# Patient Record
Sex: FEMALE | Race: WHITE
[De-identification: names, ages, dates, MRNs, and addresses within clinical notes are randomized per-mention and may not be internally consistent; named-entity substitution may affect disease eponyms.]

---

## 2017-05-26 ENCOUNTER — HOSPITAL ENCOUNTER (OUTPATIENT)
Dept: HOSPITAL 47 - RADNMMAIN | Age: 57
Discharge: HOME | End: 2017-05-26
Payer: COMMERCIAL

## 2017-05-26 DIAGNOSIS — R07.9: Primary | ICD-10-CM

## 2017-05-26 PROCEDURE — 93017 CV STRESS TEST TRACING ONLY: CPT

## 2017-05-26 PROCEDURE — 78452 HT MUSCLE IMAGE SPECT MULT: CPT

## 2017-05-26 NOTE — EST
DATE OF SERVICE:  05/26/2017



AGE:   56Y        SEX:  F        HT:    5'3"       WT:  198 lbs.       

     

Protocol [place]:  Other: Lexiscan Cardiolite  Stage:  Dur. of Exercise: 



*Heart Rate         Blood Pressure                               

*Rest:  66          Rest:  138/83                                

*                              

*Max. Achieved:     91   Maximum BP:  151/70 

85% PMHR:  

100% PMHR:          



*METS:    





INDICATIONS:  Chest pain. 



MEDICATIONS:   Ibuprofen, Xanax. 



Patient was given Lexiscan injection over a period of 15 seconds. Peak 

heart rate of 91 was achieved. Maximum blood pressure of 151/70 mmHg 

was noted. Resting EKG shows normal sinus rhythm with normal NV 

interval and QRS duration and normal ST-T waves. No ST segment 

depression suggestive of ischemia is noted. The results of the nuclear 

study will follow.

## 2017-05-26 NOTE — NM
EXAMINATION TYPE: NM stress lexiscan cardiolite

 

DATE OF EXAM: 5/26/2017 11:24 AM

 

COMPARISON: NONE

 

HISTORY: Chest pain

 

TECHNIQUE:  After the intravenous administration of 10.1 mCi Tc 99m Sestamibi - Cardiolite resting SP
ECT images acquired 45 minutes post injection. 

 

The patient received 0.4mg Lexiscan, 26.4 mCi Tc 99m Sestamibi - Stress images obtained 30 minutes po
st injection 

 

FINDINGS:  

 

Review of stress and rest SPECT images demonstrates there is a fixed defect involving the apex and an
terior wall the myocardium.  Gated analysis shows normal wall motion with an estimated left ventricul
ar ejection fraction of 62 %.

 

 

 

IMPRESSION:

1. Fixed defect involving the myocardium suggestive of previous infarction.

2. No definite stress-induced reversibility.

## 2018-07-31 ENCOUNTER — HOSPITAL ENCOUNTER (INPATIENT)
Dept: HOSPITAL 47 - EC | Age: 58
LOS: 5 days | Discharge: HOME | DRG: 392 | End: 2018-08-05
Attending: SURGERY | Admitting: SURGERY
Payer: COMMERCIAL

## 2018-07-31 VITALS — BODY MASS INDEX: 37.3 KG/M2

## 2018-07-31 DIAGNOSIS — K59.00: ICD-10-CM

## 2018-07-31 DIAGNOSIS — Z90.710: ICD-10-CM

## 2018-07-31 DIAGNOSIS — Z82.49: ICD-10-CM

## 2018-07-31 DIAGNOSIS — E78.5: ICD-10-CM

## 2018-07-31 DIAGNOSIS — K57.80: Primary | ICD-10-CM

## 2018-07-31 DIAGNOSIS — I10: ICD-10-CM

## 2018-07-31 DIAGNOSIS — Z88.5: ICD-10-CM

## 2018-07-31 DIAGNOSIS — Z79.899: ICD-10-CM

## 2018-07-31 DIAGNOSIS — Z96.653: ICD-10-CM

## 2018-07-31 DIAGNOSIS — Z85.41: ICD-10-CM

## 2018-07-31 DIAGNOSIS — F32.9: ICD-10-CM

## 2018-07-31 DIAGNOSIS — M19.90: ICD-10-CM

## 2018-07-31 DIAGNOSIS — Z87.891: ICD-10-CM

## 2018-07-31 LAB
ALBUMIN SERPL-MCNC: 3.6 G/DL (ref 3.5–5)
ALP SERPL-CCNC: 81 U/L (ref 38–126)
ALT SERPL-CCNC: 37 U/L (ref 9–52)
AMYLASE SERPL-CCNC: 54 U/L (ref 30–110)
ANION GAP SERPL CALC-SCNC: 11 MMOL/L
AST SERPL-CCNC: 23 U/L (ref 14–36)
BASOPHILS # BLD AUTO: 0 K/UL (ref 0–0.2)
BASOPHILS NFR BLD AUTO: 0 %
BUN SERPL-SCNC: 12 MG/DL (ref 7–17)
CALCIUM SPEC-MCNC: 9.3 MG/DL (ref 8.4–10.2)
CHLORIDE SERPL-SCNC: 95 MMOL/L (ref 98–107)
CO2 SERPL-SCNC: 32 MMOL/L (ref 22–30)
EOSINOPHIL # BLD AUTO: 0.1 K/UL (ref 0–0.7)
EOSINOPHIL NFR BLD AUTO: 1 %
ERYTHROCYTE [DISTWIDTH] IN BLOOD BY AUTOMATED COUNT: 4.69 M/UL (ref 3.8–5.4)
ERYTHROCYTE [DISTWIDTH] IN BLOOD: 13.5 % (ref 11.5–15.5)
GLUCOSE SERPL-MCNC: 99 MG/DL (ref 74–99)
HCT VFR BLD AUTO: 39.6 % (ref 34–46)
HGB BLD-MCNC: 13.3 GM/DL (ref 11.4–16)
HYALINE CASTS UR QL AUTO: 1 /LPF (ref 0–2)
LIPASE SERPL-CCNC: 81 U/L (ref 23–300)
LYMPHOCYTES # SPEC AUTO: 1.5 K/UL (ref 1–4.8)
LYMPHOCYTES NFR SPEC AUTO: 14 %
MCH RBC QN AUTO: 28.5 PG (ref 25–35)
MCHC RBC AUTO-ENTMCNC: 33.7 G/DL (ref 31–37)
MCV RBC AUTO: 84.5 FL (ref 80–100)
MONOCYTES # BLD AUTO: 0.9 K/UL (ref 0–1)
MONOCYTES NFR BLD AUTO: 8 %
NEUTROPHILS # BLD AUTO: 8.6 K/UL (ref 1.3–7.7)
NEUTROPHILS NFR BLD AUTO: 76 %
PH UR: 5.5 [PH] (ref 5–8)
PLATELET # BLD AUTO: 333 K/UL (ref 150–450)
POTASSIUM SERPL-SCNC: 3.8 MMOL/L (ref 3.5–5.1)
PROT SERPL-MCNC: 6.4 G/DL (ref 6.3–8.2)
PROT UR QL: (no result)
RBC UR QL: 1 /HPF (ref 0–5)
SODIUM SERPL-SCNC: 138 MMOL/L (ref 137–145)
SP GR UR: 1.02 (ref 1–1.03)
SQUAMOUS UR QL AUTO: 9 /HPF (ref 0–4)
UROBILINOGEN UR QL STRIP: <2 MG/DL (ref ?–2)
WBC # BLD AUTO: 11.3 K/UL (ref 3.8–10.6)
WBC #/AREA URNS HPF: 15 /HPF (ref 0–5)

## 2018-07-31 PROCEDURE — 74177 CT ABD & PELVIS W/CONTRAST: CPT

## 2018-07-31 PROCEDURE — 36415 COLL VENOUS BLD VENIPUNCTURE: CPT

## 2018-07-31 PROCEDURE — 83605 ASSAY OF LACTIC ACID: CPT

## 2018-07-31 PROCEDURE — 99285 EMERGENCY DEPT VISIT HI MDM: CPT

## 2018-07-31 PROCEDURE — 96375 TX/PRO/DX INJ NEW DRUG ADDON: CPT

## 2018-07-31 PROCEDURE — 85025 COMPLETE CBC W/AUTO DIFF WBC: CPT

## 2018-07-31 PROCEDURE — 87040 BLOOD CULTURE FOR BACTERIA: CPT

## 2018-07-31 PROCEDURE — 96365 THER/PROPH/DIAG IV INF INIT: CPT

## 2018-07-31 PROCEDURE — 96361 HYDRATE IV INFUSION ADD-ON: CPT

## 2018-07-31 PROCEDURE — 80053 COMPREHEN METABOLIC PANEL: CPT

## 2018-07-31 PROCEDURE — 81001 URINALYSIS AUTO W/SCOPE: CPT

## 2018-07-31 PROCEDURE — 87086 URINE CULTURE/COLONY COUNT: CPT

## 2018-07-31 PROCEDURE — 83690 ASSAY OF LIPASE: CPT

## 2018-07-31 PROCEDURE — 96376 TX/PRO/DX INJ SAME DRUG ADON: CPT

## 2018-07-31 PROCEDURE — 82150 ASSAY OF AMYLASE: CPT

## 2018-07-31 RX ADMIN — CEFAZOLIN SCH MLS/HR: 330 INJECTION, POWDER, FOR SOLUTION INTRAMUSCULAR; INTRAVENOUS at 23:55

## 2018-07-31 NOTE — CT
EXAMINATION TYPE: CT abdomen pelvis w con

 

DATE OF EXAM: 7/31/2018

 

HISTORY: Abdominal pain with nausea

 

CT DLP: 1106.4mGycm  Automated Exposure Control for Dose Reduction was Utilized.

 

CONTRAST: 

CT scan of the abdomen and pelvis is performed with IV Contrast, patient injected with 100 mL of Isov
ue 300.

 

COMPARISON: None.

 

FINDINGS:

 

LUNG BASES: No significant abnormality is appreciated.

 

LIVER/GB: There is a fluid attenuated left hepatic lobe cyst measuring 1.4 cm. Remainder the liver is
 grossly unremarkable. No radiopaque gallstones.

 

PANCREAS: No significant abnormality is seen. No ductal dilatation.

 

SPLEEN: Small splenule seen adjacent to the native spleen. No splenomegaly.

 

ADRENALS: No significant abnormality is seen.

 

KIDNEYS: No significant abnormality is seen.

 

BOWEL: There is an approximately 3.3 x 4.0 x 3.3 cm pericolonic abscess with air-fluid level and nume
myles foci of internal air along the mesenteric border of the sigmoid colon with associated surroundin
g inflammatory fat stranding and phlegmonous changes. Other encapsulated foci of free air are seen mo
re anteriorly on series 3 image 67. This is indicative of microperforation of acute diverticulitis. T
here is associated bowel wall thickening and hyperemia as well as fascial plane thickening. No second
rafaela colonic ileus or obstruction is seen.

 

UTERUS/ADNEXA: Uterus appears surgically absent.

 

LYMPH NODES: No greater than 1cm abdominal or pelvic lymph nodes are appreciated.

 

OSSEOUS STRUCTURES: Osseous structures appear intact..

 

OTHER: Moderate calcific and noncalcific atheromatous plaquing is seen of the abdominal aorta and its
 branches. Abdominal aorta is of normal course and caliber.

 

IMPRESSION: Acute complicated sigmoid diverticulitis with pericolonic abscess and additional foci of 
loculated microperforation/free air. The approximately 3.3 x 4.0 x 3.3 cm pericolonic abscess is seen
 along the mesenteric border of the sigmoid colon and is intimately associated with the bowel wall wi
th a portion that is possibly submucosal, therefore percutaneous drainage may not be accessible.

## 2018-07-31 NOTE — ED
Abdominal Pain HPI





<Reese Muñoz - Last Filed: 18 21:49>





- General


Source: patient


Mode of arrival: ambulatory


Limitations: no limitations





<Cristy Barclay - Last Filed: 18 22:00>





- General


Chief Complaint: Abdominal Pain


Stated Complaint: abd pain


Time Seen by Provider: 18 19:00





- History of Present Illness


Initial Comments: 


57-year-old female patient presents the emergency department today for 

evaluation of lower abdominal pain and fever.  Patient states that she's been 

having pain in her abdomen for the last 4 days. Patient states the pain is 

located across her lower abdomen and is sharp in nature. States it radiates 

into her back. Patient states that yesterday she started to become very 

nauseous.  She states that she has been constipated and passing only very small 

amounts of diarrhea type stool.  States that her abdomen is very tender.  

States that yesterday she developed a fever, has been as high as 101.1F at 

home.  She has no appetite and has not been eating.  States that she does have 

a history of diverticulitis and her symptoms seem similar to when she's had 

that in the past.  States the only abdominal surgery she has had was a tumor 

removed several years ago.  She denies any hematochezia, melena, or 

hematemesis.  States that she was having painful urination a couple of days ago 

but that seems to have resolved.  She denies any hematuria, urinary frequency, 

urinary urgency.  Patient denies any recent rash, shortness breath, chest pain, 

numbness, tingling, dizziness, weakness, hematuria, dysuria, urinary urgency, 

urinary frequency, headache, visual changes, or any other complaints. (Cristy Barclay)





- Related Data


 Home Medications











 Medication  Instructions  Recorded  Confirmed


 


ALPRAZolam 1 mg PO HS 06/15/16 07/31/18


 


Atorvastatin [Lipitor] 40 mg PO DAILY 17


 


Metoprolol Succinate (ER) [Toprol 25 mg PO DAILY 17





Xl]   


 


Triamterene-Hctz 37.5-25Mg 1 cap PO DAILY 17





[Dyazide 37.5-25 Capsule]   


 


Ciprofloxacin HCl [Cipro] 500 mg PO Q12HR 18


 


Phentermine HCl [Adipex-P] 37.5 mg PO QAM 18


 


Potassium 99 mg PO DAILY 18


 


Vitamin B Complex 1 cap PO DAILY 18











 Allergies











Allergy/AdvReac Type Severity Reaction Status Date / Time


 


hydromorphone HCl AdvReac Severe Nausea & Verified 18 18:38





[From Dilaudid]   Vomiting  














Review of Systems


ROS Other: All systems not noted in ROS Statement are negative.





<Reese Muñoz - Last Filed: 18 21:49>


ROS Other: All systems not noted in ROS Statement are negative.





<Cristy Barclay - Last Filed: 18 22:00>


ROS Statement: 


Those systems with pertinent positive or pertinent negative responses have been 

documented in the HPI.








Past Medical History


Past Medical History: Cancer, Osteoarthritis (OA)


Additional Past Medical History / Comment(s): Arthritis, Bilateral total knees 

[id number], Cervical cancer , total hysterectomy, hiatal hernia, rectal 

fistula status post surgery with recurrence.


History of Any Multi-Drug Resistant Organisms: None Reported


Past Surgical History: Hysterectomy, Joint Replacement


Additional Past Surgical History / Comment(s): Tie wrap for rectal fistula last 

colonoscopy less than one year ago, removal of ovarian tumor which was benign, 

bilateral bunionectomies, bilateral total knee replacements. 17 total 

breast reduction, left and liposuction.


Past Anesthesia/Blood Transfusion Reactions: No Reported Reaction


Smoking Status: Former smoker


Past Alcohol Use History: None Reported


Past Drug Use History: Marijuana





- Past Family History


  ** Mother


Family Medical History: Cancer, CVA/TIA


Additional Family Medical History / Comment(s): Mother is alive in her 80s with 

history of CVA and resides in a nursing home.





  ** Father


Family Medical History: No Reported History


Additional Family Medical History / Comment(s): Father is alive in his 80s with 

history of hypertension and benign brain tumor.





  ** Brother(s)


Additional Family Medical History / Comment(s): She has 3 brothers and one has 

 from a drug overdose.  She does not have any sisters.  She has one 

daughter that was an adopted niece.





<Cristy Barclay - Last Filed: 18 22:00>





General Exam


Limitations: no limitations


General appearance: alert, in no apparent distress, other (Social well-developed

, well-nourished adult female patient in mild distress related to pain.  Vital 

signs upon presentation are temperature 99.5F, pulse 95, respirations 16, 

blood pressure 126/82, pulse ox 97% on room air.)


Eye exam: Present: normal appearance, PERRL, EOMI.  Absent: scleral icterus, 

conjunctival injection, periorbital swelling


ENT exam: Present: normal exam, normal oropharynx, mucous membranes moist


Respiratory exam: Present: normal lung sounds bilaterally.  Absent: respiratory 

distress, wheezes, rales, rhonchi, stridor


Cardiovascular Exam: Present: regular rate, normal rhythm, normal heart sounds.

  Absent: systolic murmur, diastolic murmur, rubs, gallop, clicks


GI/Abdominal exam: Present: soft, tenderness (Lower abdominal tenderness worse 

over the left lower quadrant), normal bowel sounds.  Absent: distended, guarding

, rebound, rigid


Neurological exam: Present: alert, oriented X3, CN II-XII intact


Psychiatric exam: Present: normal affect, normal mood


Skin exam: Present: warm, dry, intact, normal color.  Absent: rash





<Cristy Barclay - Last Filed: 18 22:00>





Course





<Reese Muñoz - Last Filed: 18 21:49>





<Cristy Barclay - Last Filed: 18 22:00>


 Vital Signs











  18





  18:27 21:07


 


Temperature 99.5 F 99.4 F


 


Pulse Rate 95 71


 


Respiratory 16 18





Rate  


 


Blood Pressure 126/82 102/51


 


O2 Sat by Pulse 97 96





Oximetry  














- Reevaluation(s)


Reevaluation #1: 





18 21:42


Patient meets sepsis criteria at this time. Antibiotics ordered.  (Cristy Barclay)





Medical Decision Making





- Lab Data


Result diagrams: 


 18 19:50





 18 19:50





<Reese Muñoz - Last Filed: 18 21:49>





- Lab Data


Result diagrams: 


 18 19:50





 18 19:50





- Radiology Data


Radiology results: report reviewed, image reviewed





<Cristy Barclay - Last Filed: 18 22:00>





- Medical Decision Making





Patient reevaluated by myself, Dr. Muñoz.  Abdomen soft with mild tenderness 

left lower abdomen.  Patient and family are updated on results and plan.  Case 

was discussed in detail with Dr. Mcwilliams, who will admit for surgical call with 

IV antibiotics and will reevaluate in the morning.  I reviewed and agree with.  

Findings included all diagnostic interpretation and treatment plan. (Reese Muñoz)


57-year-old female patient presented to emergency department today for 

evaluation of lower abdominal pain and fever.  Physical examination did reveal 

lower abdominal tenderness worse in the left lower quadrant.  Labs reviewed and 

did reveal a mildly elevated white blood cell count.  Lactic acid was negative.

  Urine has been sent for culture.  CT of the abdomen and pelvis was obtained 

and did show complicated diverticulitis with pericolonic abscess and 

microperforation.  My attending Dr. Muñoz did speak to the surgeon on call Dr. Harrison.  Patient will be admitted for surgical evaluation.  We did start 

Levaquin and Flagyl.  Did inform patient and family of results and plan, they 

are agreeable.


 (Cristy Barclay)





- Lab Data


 Lab Results











  18 Range/Units





  19:50 19:50 19:50 


 


WBC   11.3 H   (3.8-10.6)  k/uL


 


RBC   4.69   (3.80-5.40)  m/uL


 


Hgb   13.3   (11.4-16.0)  gm/dL


 


Hct   39.6   (34.0-46.0)  %


 


MCV   84.5   (80.0-100.0)  fL


 


MCH   28.5   (25.0-35.0)  pg


 


MCHC   33.7   (31.0-37.0)  g/dL


 


RDW   13.5   (11.5-15.5)  %


 


Plt Count   333   (150-450)  k/uL


 


Neutrophils %   76   %


 


Lymphocytes %   14   %


 


Monocytes %   8   %


 


Eosinophils %   1   %


 


Basophils %   0   %


 


Neutrophils #   8.6 H   (1.3-7.7)  k/uL


 


Lymphocytes #   1.5   (1.0-4.8)  k/uL


 


Monocytes #   0.9   (0-1.0)  k/uL


 


Eosinophils #   0.1   (0-0.7)  k/uL


 


Basophils #   0.0   (0-0.2)  k/uL


 


Sodium  138    (137-145)  mmol/L


 


Potassium  3.8    (3.5-5.1)  mmol/L


 


Chloride  95 L    ()  mmol/L


 


Carbon Dioxide  32 H    (22-30)  mmol/L


 


Anion Gap  11    mmol/L


 


BUN  12    (7-17)  mg/dL


 


Creatinine  0.80    (0.52-1.04)  mg/dL


 


Est GFR (CKD-EPI)AfAm  >90    (>60 ml/min/1.73 sqM)  


 


Est GFR (CKD-EPI)NonAf  84    (>60 ml/min/1.73 sqM)  


 


Glucose  99    (74-99)  mg/dL


 


Plasma Lactic Acid [place]    0.9  (0.7-2.0)  mmol/L


 


Calcium  9.3    (8.4-10.2)  mg/dL


 


Total Bilirubin  0.5    (0.2-1.3)  mg/dL


 


AST  23    (14-36)  U/L


 


ALT  37    (9-52)  U/L


 


Alkaline Phosphatase  81    ()  U/L


 


Total Protein  6.4    (6.3-8.2)  g/dL


 


Albumin  3.6    (3.5-5.0)  g/dL


 


Amylase  54    ()  U/L


 


Lipase  81    ()  U/L


 


Urine Color     


 


Urine Appearance     (Clear)  


 


Urine pH     (5.0-8.0)  


 


Ur Specific Gravity     (1.001-1.035)  


 


Urine Protein     (Negative)  


 


Urine Glucose (UA)     (Negative)  


 


Urine Ketones     (Negative)  


 


Urine Blood     (Negative)  


 


Urine Nitrite     (Negative)  


 


Urine Bilirubin     (Negative)  


 


Urine Urobilinogen     (<2.0)  mg/dL


 


Ur Leukocyte Esterase     (Negative)  


 


Urine RBC     (0-5)  /hpf


 


Urine WBC     (0-5)  /hpf


 


Ur Squamous Epith Cells     (0-4)  /hpf


 


Urine Bacteria     (None)  /hpf


 


Hyaline Casts     (0-2)  /lpf


 


Urine Mucus     (None)  /hpf














  18 Range/Units





  19:50 


 


WBC   (3.8-10.6)  k/uL


 


RBC   (3.80-5.40)  m/uL


 


Hgb   (11.4-16.0)  gm/dL


 


Hct   (34.0-46.0)  %


 


MCV   (80.0-100.0)  fL


 


MCH   (25.0-35.0)  pg


 


MCHC   (31.0-37.0)  g/dL


 


RDW   (11.5-15.5)  %


 


Plt Count   (150-450)  k/uL


 


Neutrophils %   %


 


Lymphocytes %   %


 


Monocytes %   %


 


Eosinophils %   %


 


Basophils %   %


 


Neutrophils #   (1.3-7.7)  k/uL


 


Lymphocytes #   (1.0-4.8)  k/uL


 


Monocytes #   (0-1.0)  k/uL


 


Eosinophils #   (0-0.7)  k/uL


 


Basophils #   (0-0.2)  k/uL


 


Sodium   (137-145)  mmol/L


 


Potassium   (3.5-5.1)  mmol/L


 


Chloride   ()  mmol/L


 


Carbon Dioxide   (22-30)  mmol/L


 


Anion Gap   mmol/L


 


BUN   (7-17)  mg/dL


 


Creatinine   (0.52-1.04)  mg/dL


 


Est GFR (CKD-EPI)AfAm   (>60 ml/min/1.73 sqM)  


 


Est GFR (CKD-EPI)NonAf   (>60 ml/min/1.73 sqM)  


 


Glucose   (74-99)  mg/dL


 


Plasma Lactic Acid [place]   (0.7-2.0)  mmol/L


 


Calcium   (8.4-10.2)  mg/dL


 


Total Bilirubin   (0.2-1.3)  mg/dL


 


AST   (14-36)  U/L


 


ALT   (9-52)  U/L


 


Alkaline Phosphatase   ()  U/L


 


Total Protein   (6.3-8.2)  g/dL


 


Albumin   (3.5-5.0)  g/dL


 


Amylase   ()  U/L


 


Lipase   ()  U/L


 


Urine Color  Yellow  


 


Urine Appearance  Cloudy H  (Clear)  


 


Urine pH  5.5  (5.0-8.0)  


 


Ur Specific Gravity  1.019  (1.001-1.035)  


 


Urine Protein  1+ H  (Negative)  


 


Urine Glucose (UA)  Negative  (Negative)  


 


Urine Ketones  Negative  (Negative)  


 


Urine Blood  Trace H  (Negative)  


 


Urine Nitrite  Negative  (Negative)  


 


Urine Bilirubin  Negative  (Negative)  


 


Urine Urobilinogen  <2.0  (<2.0)  mg/dL


 


Ur Leukocyte Esterase  Small H  (Negative)  


 


Urine RBC  1  (0-5)  /hpf


 


Urine WBC  15 H  (0-5)  /hpf


 


Ur Squamous Epith Cells  9 H  (0-4)  /hpf


 


Urine Bacteria  Occasional H  (None)  /hpf


 


Hyaline Casts  1  (0-2)  /lpf


 


Urine Mucus  Rare H  (None)  /hpf














- Radiology Data


Computed tomography scan abdomen and pelvis with contrast was obtained.  Report 

was reviewed in its entirety.  Impression by Dr. Spain shows acute complicated 

sigmoid diverticulitis with pericolonic abscess and additional foci of 

loculated microperforation/free air.  The approximately 3.3 x 4.0 x 3.3 cm 

pericolic abscess is seen along the mesenteric border of the sigmoid colon is 

intimately associated with the bowel wall and a portion that is possibly 

submucosal, therefore percutaneous drainage may not be accessible. (Cristy Barclay)





Disposition





<Reese Muñoz - Last Filed: 18 21:49>


Decision to Admit Reason: Admit from EC


Decision Date: 18


Decision Time: 22:00





<Cristy Barclay - Last Filed: 18 22:00>


Clinical Impression: 


 Diverticulitis of intestine with perforation and abscess, Sepsis





Disposition: ADMITTED AS IP TO THIS [facility]


Condition: Serious


Referrals: 


Alfred Brody DO [Primary Care Provider] - 1-2 days

## 2018-08-01 LAB
ALBUMIN SERPL-MCNC: 2.9 G/DL (ref 3.5–5)
ALP SERPL-CCNC: 64 U/L (ref 38–126)
ALT SERPL-CCNC: 28 U/L (ref 9–52)
ANION GAP SERPL CALC-SCNC: 7 MMOL/L
AST SERPL-CCNC: 18 U/L (ref 14–36)
BASOPHILS # BLD AUTO: 0 K/UL (ref 0–0.2)
BASOPHILS NFR BLD AUTO: 0 %
BUN SERPL-SCNC: 10 MG/DL (ref 7–17)
CALCIUM SPEC-MCNC: 8.3 MG/DL (ref 8.4–10.2)
CHLORIDE SERPL-SCNC: 101 MMOL/L (ref 98–107)
CO2 SERPL-SCNC: 31 MMOL/L (ref 22–30)
EOSINOPHIL # BLD AUTO: 0.1 K/UL (ref 0–0.7)
EOSINOPHIL NFR BLD AUTO: 1 %
ERYTHROCYTE [DISTWIDTH] IN BLOOD BY AUTOMATED COUNT: 4.16 M/UL (ref 3.8–5.4)
ERYTHROCYTE [DISTWIDTH] IN BLOOD: 13.5 % (ref 11.5–15.5)
GLUCOSE SERPL-MCNC: 91 MG/DL (ref 74–99)
HCT VFR BLD AUTO: 36 % (ref 34–46)
HGB BLD-MCNC: 11.5 GM/DL (ref 11.4–16)
LYMPHOCYTES # SPEC AUTO: 1.2 K/UL (ref 1–4.8)
LYMPHOCYTES NFR SPEC AUTO: 14 %
MCH RBC QN AUTO: 27.7 PG (ref 25–35)
MCHC RBC AUTO-ENTMCNC: 32 G/DL (ref 31–37)
MCV RBC AUTO: 86.5 FL (ref 80–100)
MONOCYTES # BLD AUTO: 0.5 K/UL (ref 0–1)
MONOCYTES NFR BLD AUTO: 6 %
NEUTROPHILS # BLD AUTO: 6.1 K/UL (ref 1.3–7.7)
NEUTROPHILS NFR BLD AUTO: 76 %
PLATELET # BLD AUTO: 289 K/UL (ref 150–450)
POTASSIUM SERPL-SCNC: 3.7 MMOL/L (ref 3.5–5.1)
PROT SERPL-MCNC: 5.2 G/DL (ref 6.3–8.2)
SODIUM SERPL-SCNC: 139 MMOL/L (ref 137–145)
WBC # BLD AUTO: 8 K/UL (ref 3.8–10.6)

## 2018-08-01 RX ADMIN — MORPHINE SULFATE PRN MG: 4 INJECTION, SOLUTION INTRAMUSCULAR; INTRAVENOUS at 18:41

## 2018-08-01 RX ADMIN — ONDANSETRON PRN MG: 2 INJECTION INTRAMUSCULAR; INTRAVENOUS at 17:04

## 2018-08-01 RX ADMIN — ONDANSETRON PRN MG: 2 INJECTION INTRAMUSCULAR; INTRAVENOUS at 08:17

## 2018-08-01 RX ADMIN — MORPHINE SULFATE PRN MG: 4 INJECTION, SOLUTION INTRAMUSCULAR; INTRAVENOUS at 04:55

## 2018-08-01 RX ADMIN — MORPHINE SULFATE PRN MG: 4 INJECTION, SOLUTION INTRAMUSCULAR; INTRAVENOUS at 12:45

## 2018-08-01 RX ADMIN — ONDANSETRON PRN MG: 2 INJECTION INTRAMUSCULAR; INTRAVENOUS at 00:12

## 2018-08-01 RX ADMIN — CEFAZOLIN SCH MLS/HR: 330 INJECTION, POWDER, FOR SOLUTION INTRAMUSCULAR; INTRAVENOUS at 12:52

## 2018-08-02 LAB
ALBUMIN SERPL-MCNC: 3.1 G/DL (ref 3.5–5)
ALP SERPL-CCNC: 62 U/L (ref 38–126)
ALT SERPL-CCNC: 33 U/L (ref 9–52)
ANION GAP SERPL CALC-SCNC: 12 MMOL/L
AST SERPL-CCNC: 21 U/L (ref 14–36)
BASOPHILS # BLD AUTO: 0 K/UL (ref 0–0.2)
BASOPHILS NFR BLD AUTO: 0 %
BUN SERPL-SCNC: 11 MG/DL (ref 7–17)
CALCIUM SPEC-MCNC: 8.8 MG/DL (ref 8.4–10.2)
CHLORIDE SERPL-SCNC: 103 MMOL/L (ref 98–107)
CO2 SERPL-SCNC: 24 MMOL/L (ref 22–30)
EOSINOPHIL # BLD AUTO: 0.2 K/UL (ref 0–0.7)
EOSINOPHIL NFR BLD AUTO: 2 %
ERYTHROCYTE [DISTWIDTH] IN BLOOD BY AUTOMATED COUNT: 4.27 M/UL (ref 3.8–5.4)
ERYTHROCYTE [DISTWIDTH] IN BLOOD: 13.5 % (ref 11.5–15.5)
GLUCOSE SERPL-MCNC: 72 MG/DL (ref 74–99)
HCT VFR BLD AUTO: 37.8 % (ref 34–46)
HGB BLD-MCNC: 12.2 GM/DL (ref 11.4–16)
LYMPHOCYTES # SPEC AUTO: 1.4 K/UL (ref 1–4.8)
LYMPHOCYTES NFR SPEC AUTO: 18 %
MCH RBC QN AUTO: 28.6 PG (ref 25–35)
MCHC RBC AUTO-ENTMCNC: 32.3 G/DL (ref 31–37)
MCV RBC AUTO: 88.7 FL (ref 80–100)
MONOCYTES # BLD AUTO: 0.4 K/UL (ref 0–1)
MONOCYTES NFR BLD AUTO: 6 %
NEUTROPHILS # BLD AUTO: 5.6 K/UL (ref 1.3–7.7)
NEUTROPHILS NFR BLD AUTO: 72 %
PLATELET # BLD AUTO: 315 K/UL (ref 150–450)
POTASSIUM SERPL-SCNC: 3.8 MMOL/L (ref 3.5–5.1)
PROT SERPL-MCNC: 5.4 G/DL (ref 6.3–8.2)
SODIUM SERPL-SCNC: 139 MMOL/L (ref 137–145)
WBC # BLD AUTO: 7.8 K/UL (ref 3.8–10.6)

## 2018-08-02 RX ADMIN — MORPHINE SULFATE PRN MG: 4 INJECTION, SOLUTION INTRAMUSCULAR; INTRAVENOUS at 03:30

## 2018-08-02 RX ADMIN — METOPROLOL SUCCINATE SCH MG: 25 TABLET, EXTENDED RELEASE ORAL at 14:33

## 2018-08-02 RX ADMIN — DEXTROSE MONOHYDRATE SCH MLS/HR: 5 INJECTION, SOLUTION INTRAVENOUS at 16:03

## 2018-08-02 RX ADMIN — DEXTROSE MONOHYDRATE SCH MLS/HR: 5 INJECTION, SOLUTION INTRAVENOUS at 11:10

## 2018-08-02 RX ADMIN — CEFAZOLIN SCH MLS/HR: 330 INJECTION, POWDER, FOR SOLUTION INTRAMUSCULAR; INTRAVENOUS at 03:35

## 2018-08-02 RX ADMIN — MORPHINE SULFATE PRN MG: 4 INJECTION, SOLUTION INTRAMUSCULAR; INTRAVENOUS at 19:15

## 2018-08-02 RX ADMIN — MORPHINE SULFATE PRN MG: 4 INJECTION, SOLUTION INTRAMUSCULAR; INTRAVENOUS at 11:52

## 2018-08-02 RX ADMIN — CEFAZOLIN SCH MLS/HR: 330 INJECTION, POWDER, FOR SOLUTION INTRAMUSCULAR; INTRAVENOUS at 14:34

## 2018-08-02 RX ADMIN — ONDANSETRON PRN MG: 2 INJECTION INTRAMUSCULAR; INTRAVENOUS at 20:24

## 2018-08-02 RX ADMIN — ACETAMINOPHEN PRN MG: 325 TABLET, FILM COATED ORAL at 07:49

## 2018-08-02 NOTE — P.CONS
History of Present Illness





- Reason for Consult


Recommendations regarding antihypertensive medications





- History of Present Illness


Patient is a pleasant 57-year-old female is admitted for left lower quadrant 

abdominal pain nausea vomiting found to have acute diverticulitis with 

microperforation and a contained abscess.  Patient was recently discharged to 

from the ER after she was diagnosed with possible urinary tract infection was 

discharged on Cipro comes back to following day didn't even take a couple of 

Cipro because she was throwing up.  Patient is on diuretic therapy for 

bilateral pedal edema was started by her GYN doctor.  Patient also take 

metoprolol for Hypertension.  Patient blood pressure is as low as 90 systolic 

on this admission patient is receiving IV fluids at this time.  Patient nausea 

vomiting improved patient abdominal pain improved.





Review of Systems


REVIEW OF SYSTEMS: 


CONSTITUTIONAL: No fever, no malaise, no fatigue. 


HEENT: No recent visual problems or hearing problems. Denied any sore throat. 


CARDIOVASCULAR: No chest pain, orthopnea, PND, no palpitations, no syncope. 


PULMONARY: No shortness of breath, no cough, no hemoptysis. 


GASTROINTESTINAL: As mentioned in HPI


NEUROLOGICAL: No headaches, no weakness, no numbness. 


HEMATOLOGICAL: Denies any bleeding or petechiae. 


GENITOURINARY: Denies any burning micturition, frequency, or urgency. 


MUSCULOSKELETAL/RHEUMATOLOGICAL: Denies any joint pain, swelling, or any muscle 

pain. 


ENDOCRINE: Denies any polyuria or polydipsia. 





The rest of the 14-point review of systems is negative.











Past Medical History


Past Medical History: Cancer, Osteoarthritis (OA)


Additional Past Medical History / Comment(s): Arthritis, Bilateral total knees 

[id number], Cervical cancer , total hysterectomy, hiatal hernia, rectal 

fistula status post surgery with recurrence.


History of Any Multi-Drug Resistant Organisms: None Reported


Past Surgical History: Hysterectomy, Joint Replacement


Additional Past Surgical History / Comment(s): Tie wrap for rectal fistula last 

colonoscopy less than one year ago, removal of ovarian tumor which was benign, 

bilateral bunionectomies, bilateral total knee replacements. 17 total 

breast reduction, left and liposuction.


Past Anesthesia/Blood Transfusion Reactions: No Reported Reaction


Past Psychological History: Depression


Smoking Status: Former smoker


Past Alcohol Use History: None Reported


Additional Past Alcohol Use History / Comment(s): Patient is a smoker one pack 

per day since she was 16 years of age.  She denies any medical marijuana, 

marijuana, street drug or alcohol use.  She works cleaning homes.


Past Drug Use History: Marijuana





- Past Family History


  ** Mother


Family Medical History: Cancer, CVA/TIA


Additional Family Medical History / Comment(s): Mother is alive in her 80s with 

history of CVA and resides in a nursing home.





  ** Father


Family Medical History: No Reported History


Additional Family Medical History / Comment(s): Father is alive in his 80s with 

history of hypertension and benign brain tumor.





  ** Brother(s)


Additional Family Medical History / Comment(s): She has 3 brothers and one has 

 from a drug overdose.  She does not have any sisters.  She has one 

daughter that was an adopted niece.





Medications and Allergies


 Home Medications











 Medication  Instructions  Recorded  Confirmed  Type


 


ALPRAZolam 1 mg PO HS 06/15/16 07/31/18 History


 


Atorvastatin [Lipitor] 40 mg PO DAILY 17 History


 


Metoprolol Succinate (ER) [Toprol 25 mg PO DAILY 17 History





Xl]    


 


Triamterene-Hctz 37.5-25Mg 1 cap PO DAILY 17 History





[Dyazide 37.5-25 Capsule]    


 


Ciprofloxacin HCl [Cipro] 500 mg PO Q12HR 18 History


 


Phentermine HCl [Adipex-P] 37.5 mg PO QAM 18 History


 


Potassium 99 mg PO DAILY 18 History


 


Vitamin B Complex 1 cap PO DAILY 18 History











 Allergies











Allergy/AdvReac Type Severity Reaction Status Date / Time


 


hydromorphone HCl AdvReac Severe Nausea & Verified 18 18:38





[From Dilaudid]   Vomiting  














Physical Exam


Vitals: 


 Vital Signs











  Temp Pulse Pulse Resp BP Pulse Ox


 


 18 14:45  98.3 F   60  16  117/75  96


 


 18 08:16  97.9 F  64  96  18  137/86 


 


 18 00:01  98.0 F   74  14  97/64  95


 


 18 20:10  97.7 F   70  16  130/79  95








 Intake and Output











 18





 06:59 14:59 22:59


 


Intake Total  450 


 


Balance  450 


 


Intake:   


 


  Intake, IV Titration  450 





  Amount   


 


    Sodium Chloride 0.9% 1,  450 





    000 ml @ 75 mls/hr IV .   





    [id number] [facility] Rx#:[id number]   


 


Other:   


 


  # Voids 1  











PHYSICAL EXAMINATION: 





GENERAL: The patient is alert and oriented x3, not in any acute distress. Well 

developed, well nourished. 


HEENT: Pupils are round and equally reacting to light. EOMI. No scleral 

icterus. No conjunctival pallor. Normocephalic, atraumatic. No pharyngeal 

erythema. No thyromegaly. 


CARDIOVASCULAR: S1 and S2 present. No murmurs, rubs, or gallops. 


PULMONARY: Chest is clear to auscultation, no wheezing or crackles. 


ABDOMEN: Soft, nontender, nondistended, normoactive bowel sounds. No palpable 

organomegaly. 


MUSCULOSKELETAL: No joint swelling or deformity.


EXTREMITIES: No cyanosis, clubbing, or pedal edema. 


NEUROLOGICAL: Gross neurological examination did not reveal any focal deficits. 


SKIN: No rashes. 











Results


CBC & Chem 7: 


 18 11:25





 18 11:25


Labs: 


 Abnormal Lab Results - Last 24 Hours (Table)











  18 Range/Units





  11:25 


 


Glucose  72 L  (74-99)  mg/dL


 


Total Protein  5.4 L  (6.3-8.2)  g/dL


 


Albumin  3.1 L  (3.5-5.0)  g/dL








 Microbiology - Last 24 Hours (Table)











 18 20:46 Urine Culture - Final





 Urine,Voided 


 


 18 19:50 Blood Culture - Preliminary





 Blood    No Growth after 24 hours














Assessment and Plan


Plan: 


-Diverticulitis with contained microperforation and a small abscess: No 

surgical intervention is being planned and patient is on IV antiemetics which 

will be continued and monitored.


-Hypertension: Skull metoprolol to avoid reflux tachycardia.  I'll discontinue 

diuretic therapy as this leading to hypotension.


-Severe osteoarthritis.


-Hyperlipidemia continue with statin.

## 2018-08-02 NOTE — P.PN
Subjective


Progress Note Date: 08/02/18





57-year-old female seen this morning at the bedside.  Patient states abdominal 

pain significantly improved.  Patient indicated that initially was having 

severe left lower quadrant abdominal pain which has since resolved.  CAT scan 

showed evidence of acute diverticulitis with possible mural abscess.  Patient 

is asking for diet to be advanced.  Currently on IV Zosyn  and afebrile labs 

pending





Objective





- Vital Signs


Vital signs: 


 Vital Signs











Temp  97.9 F   08/02/18 08:16


 


Pulse  96   08/02/18 08:16


 


Resp  18   08/02/18 08:16


 


BP  137/86   08/02/18 08:16


 


Pulse Ox  95   08/02/18 00:01








 Intake & Output











 08/01/18 08/02/18 08/02/18





 18:59 06:59 18:59


 


Other:   


 


  # Voids 1 1 














- Exam





Physical exam 57-year-old female sitting up in bed appears in no acute distress


Lungs clear adequate air movement


Heart S1-S2 audible regular


Abdomen soft no facial grimacing" with palpitation to the abdominal wall  bowel 

tones present  states passing gas no stool mild tenderness to the left lower 

quadrant nondistended


Extremities no edema








- Labs


CBC & Chem 7: 


 08/01/18 06:38





 08/01/18 06:38


Labs: 


 Microbiology - Last 24 Hours (Table)











 07/31/18 19:50 Blood Culture - Preliminary





 Blood    No Growth after 24 hours


 


 07/31/18 20:46 Urine Culture - Preliminary





 Urine,Voided 














Assessment and Plan


Assessment: 





Impression


Present on admission left lower quadrant abdominal pain suspect due to acute 

diverticulitis with abscess measuring 3 x 4 cm along the mesenteric border of 

the colon








Plan


IV antibiotics Zosyn as ordered


Pain control


DVT and GI prophylaxis


Nothing by mouth except ice chips popsicles clear liquid


Will follow with further surgical recommendations





The above impression and plan of care have been discussed and directed by 

signing physician. Jailene Carpenter nurse practitioner acting as scribe for signing 

physician.

## 2018-08-03 RX ADMIN — DEXTROSE MONOHYDRATE SCH MLS/HR: 5 INJECTION, SOLUTION INTRAVENOUS at 00:41

## 2018-08-03 RX ADMIN — PANTOPRAZOLE SODIUM SCH MG: 40 INJECTION, POWDER, FOR SOLUTION INTRAVENOUS at 08:56

## 2018-08-03 RX ADMIN — DEXTROSE MONOHYDRATE SCH MLS/HR: 5 INJECTION, SOLUTION INTRAVENOUS at 08:56

## 2018-08-03 RX ADMIN — CEFAZOLIN SCH: 330 INJECTION, POWDER, FOR SOLUTION INTRAMUSCULAR; INTRAVENOUS at 18:03

## 2018-08-03 RX ADMIN — MORPHINE SULFATE PRN MG: 4 INJECTION, SOLUTION INTRAMUSCULAR; INTRAVENOUS at 10:13

## 2018-08-03 RX ADMIN — METOPROLOL SUCCINATE SCH MG: 25 TABLET, EXTENDED RELEASE ORAL at 08:56

## 2018-08-03 RX ADMIN — ATORVASTATIN CALCIUM SCH MG: 40 TABLET, FILM COATED ORAL at 08:56

## 2018-08-03 RX ADMIN — CEFAZOLIN SCH MLS/HR: 330 INJECTION, POWDER, FOR SOLUTION INTRAMUSCULAR; INTRAVENOUS at 02:19

## 2018-08-03 RX ADMIN — ACETAMINOPHEN PRN MG: 325 TABLET, FILM COATED ORAL at 12:27

## 2018-08-03 RX ADMIN — DEXTROSE MONOHYDRATE SCH MLS/HR: 5 INJECTION, SOLUTION INTRAVENOUS at 16:05

## 2018-08-03 NOTE — P.PN
Subjective


Patient's abdominal pain is much better today.  No nausea no vomiting patient 

is able to tolerate full liquid diet today which will be advanced.  Patient is 

presently on Zosyn for diverticulitis with the intramural abscess








Constitutional: Denied any fatigue denied any fever.


Cardio vascular: denied any chest pain, palpitations


Gastrointestinal denied any nausea vomiting


Pulmonary: Denied any shortness of breath cough


Neurologic denied any new focal deficits





Objective





- Vital Signs


Vital signs: 


 Vital Signs











Temp  98.7 F   08/03/18 07:50


 


Pulse  70   08/03/18 07:50


 


Resp  14   08/03/18 07:50


 


BP  150/81   08/03/18 07:50


 


Pulse Ox  96   08/03/18 07:50








 Intake & Output











 08/02/18 08/03/18 08/03/18





 18:59 06:59 18:59


 


Intake Total 450 700 480


 


Balance 450 700 480


 


Weight   95.708 kg


 


Intake:   


 


  Intake, IV Titration 450 600 





  Amount   


 


    Sodium Chloride 0.9% 1, 450 600 





    000 ml @ 75 mls/hr IV .   





    [id number] [facility] Rx#:[id number]   


 


  Oral  100 480


 


Other:   


 


  Voiding Method   Toilet


 


  # Voids  2 1














- Exam





PHYSICAL EXAMINATION: 





GENERAL: The patient is alert and oriented x3, not in any acute distress. Well 

developed, well nourished. 


HEENT: Pupils are round and equally reacting to light. EOMI. No scleral 

icterus. No conjunctival pallor. Normocephalic, atraumatic. No pharyngeal 

erythema. No thyromegaly. 


CARDIOVASCULAR: S1 and S2 present. No murmurs, rubs, or gallops. 


PULMONARY: Chest is clear to auscultation, no wheezing or crackles. 


ABDOMEN: Soft, nontender, nondistended, normoactive bowel sounds. No palpable 

organomegaly. 


MUSCULOSKELETAL: No joint swelling or deformity.


EXTREMITIES: No cyanosis, clubbing, or pedal edema. 


NEUROLOGICAL: Gross neurological examination did not reveal any focal deficits. 


SKIN: No rashes. 








- Labs


CBC & Chem 7: 


 08/02/18 11:25





 08/02/18 11:25


Labs: 


 Microbiology - Last 24 Hours (Table)











 07/31/18 19:50 Blood Culture - Preliminary





 Blood    No Growth after 48 hours


 


 07/31/18 20:46 Urine Culture - Final





 Urine,Voided 














Assessment and Plan


Plan: 


-Diverticulitis with contained microperforation and a small abscess: No 

surgical intervention is being planned and patient is on IV antiemetics which 

will be continued and monitored.


-Hypertension: Continue metoprolol to avoid reflux tachycardia.  Discontinued 

diuretic therapy will continue to monitor


-Severe osteoarthritis.


-Hyperlipidemia continue with statin.

## 2018-08-03 NOTE — P.PN
Subjective


Progress Note Date: 08/03/18


Principal diagnosis: 





Diverticulitis





Patient doing well today.  Tolerating clears.  She is passing flatus.  No bowel 

movement.  No labs from today.  She is hungry.





Objective





- Vital Signs


Vital signs: 


 Vital Signs











Temp  98.7 F   08/03/18 07:50


 


Pulse  70   08/03/18 07:50


 


Resp  14   08/03/18 07:50


 


BP  150/81   08/03/18 07:50


 


Pulse Ox  96   08/03/18 07:50








 Intake & Output











 08/02/18 08/03/18 08/03/18





 18:59 06:59 18:59


 


Intake Total 450 700 480


 


Balance 450 700 480


 


Weight   95.708 kg


 


Intake:   


 


  Intake, IV Titration 450 600 





  Amount   


 


    Sodium Chloride 0.9% 1, 450 600 





    000 ml @ 75 mls/hr IV .   





    [id number] [facility] Rx#:[id number]   


 


  Oral  100 480


 


Other:   


 


  Voiding Method   Toilet


 


  # Voids  2 1














- Exam





Abdomen: Soft, nondistended, minimal left lower quadrant tenderness





- Labs


CBC & Chem 7: 


 08/02/18 11:25





 08/02/18 11:25


Labs: 


 Microbiology - Last 24 Hours (Table)











 07/31/18 19:50 Blood Culture - Preliminary





 Blood    No Growth after 48 hours


 


 07/31/18 20:46 Urine Culture - Final





 Urine,Voided 














Assessment and Plan


(1) Diverticulitis of intestine with perforation and abscess


Narrative/Plan: 


Continue antibiotics.  Advance diet.  Ambulate.


Current Visit: Yes   Status: Acute   Code(s): K57.80 - DVTRCLI OF INTEST, PART 

UNSP, W PERF AND ABSCESS W/O BLEED   SNOMED Code(s): 228937941

## 2018-08-04 LAB
BASOPHILS # BLD AUTO: 0 K/UL (ref 0–0.2)
BASOPHILS NFR BLD AUTO: 0 %
EOSINOPHIL # BLD AUTO: 0.3 K/UL (ref 0–0.7)
EOSINOPHIL NFR BLD AUTO: 5 %
ERYTHROCYTE [DISTWIDTH] IN BLOOD BY AUTOMATED COUNT: 4.12 M/UL (ref 3.8–5.4)
ERYTHROCYTE [DISTWIDTH] IN BLOOD: 13.5 % (ref 11.5–15.5)
HCT VFR BLD AUTO: 35.1 % (ref 34–46)
HGB BLD-MCNC: 11.5 GM/DL (ref 11.4–16)
LYMPHOCYTES # SPEC AUTO: 2.1 K/UL (ref 1–4.8)
LYMPHOCYTES NFR SPEC AUTO: 37 %
MCH RBC QN AUTO: 27.9 PG (ref 25–35)
MCHC RBC AUTO-ENTMCNC: 32.7 G/DL (ref 31–37)
MCV RBC AUTO: 85.4 FL (ref 80–100)
MONOCYTES # BLD AUTO: 0.4 K/UL (ref 0–1)
MONOCYTES NFR BLD AUTO: 7 %
NEUTROPHILS # BLD AUTO: 2.6 K/UL (ref 1.3–7.7)
NEUTROPHILS NFR BLD AUTO: 47 %
PLATELET # BLD AUTO: 337 K/UL (ref 150–450)
WBC # BLD AUTO: 5.5 K/UL (ref 3.8–10.6)

## 2018-08-04 RX ADMIN — DEXTROSE MONOHYDRATE SCH MLS/HR: 5 INJECTION, SOLUTION INTRAVENOUS at 00:21

## 2018-08-04 RX ADMIN — CEFAZOLIN SCH MLS/HR: 330 INJECTION, POWDER, FOR SOLUTION INTRAMUSCULAR; INTRAVENOUS at 19:01

## 2018-08-04 RX ADMIN — PANTOPRAZOLE SODIUM SCH MG: 40 INJECTION, POWDER, FOR SOLUTION INTRAVENOUS at 08:38

## 2018-08-04 RX ADMIN — MORPHINE SULFATE PRN MG: 4 INJECTION, SOLUTION INTRAMUSCULAR; INTRAVENOUS at 14:53

## 2018-08-04 RX ADMIN — DEXTROSE MONOHYDRATE SCH MLS/HR: 5 INJECTION, SOLUTION INTRAVENOUS at 17:00

## 2018-08-04 RX ADMIN — MORPHINE SULFATE PRN MG: 4 INJECTION, SOLUTION INTRAMUSCULAR; INTRAVENOUS at 02:49

## 2018-08-04 RX ADMIN — CEFAZOLIN SCH: 330 INJECTION, POWDER, FOR SOLUTION INTRAMUSCULAR; INTRAVENOUS at 19:31

## 2018-08-04 RX ADMIN — DEXTROSE MONOHYDRATE SCH MLS/HR: 5 INJECTION, SOLUTION INTRAVENOUS at 23:12

## 2018-08-04 RX ADMIN — DEXTROSE MONOHYDRATE SCH MLS/HR: 5 INJECTION, SOLUTION INTRAVENOUS at 08:39

## 2018-08-04 RX ADMIN — METOPROLOL SUCCINATE SCH MG: 25 TABLET, EXTENDED RELEASE ORAL at 08:38

## 2018-08-04 RX ADMIN — ATORVASTATIN CALCIUM SCH MG: 40 TABLET, FILM COATED ORAL at 08:38

## 2018-08-04 NOTE — P.PN
Subjective


Progress Note Date: 08/04/18


Principal diagnosis: 





Diverticulitis





Patient feels better today.  Pain improved.  White blood cell count is normal.  

Tolerating full liquids.  Hungry for more to eat.





Objective





- Vital Signs


Vital signs: 


 Vital Signs











Temp  98.7 F   08/04/18 07:00


 


Pulse  50 L  08/04/18 07:00


 


Resp  16   08/04/18 07:00


 


BP  114/78   08/04/18 07:00


 


Pulse Ox  98   08/04/18 07:00








 Intake & Output











 08/03/18 08/04/18 08/04/18





 18:59 06:59 18:59


 


Intake Total 1205 1000 240


 


Balance 1205 1000 240


 


Weight 95.708 kg  


 


Intake:   


 


  Intake, IV Titration 725 650 





  Amount   


 


    Piperacillin-Tazobactam 3 50 50 





    .375 gm In Dextrose/Water   





    1 50ml.bag @ 12.5 mls/hr   





    IVPB Q8HR [facility] Rx#:   





    [id number]   


 


    Sodium Chloride 0.9% 1, 675 600 





    000 ml @ 75 mls/hr IV .   





    [id number] [facility] Rx#:[id number]   


 


  Oral 480 350 240


 


Other:   


 


  Voiding Method Toilet Toilet 


 


  # Voids 2 3 


 


  # Bowel Movements  1 














- Exam





Abdomen: Soft, nondistended, minimal left lower quadrant tenderness





- Labs


CBC & Chem 7: 


 08/04/18 05:37





 08/02/18 11:25


Labs: 


 Microbiology - Last 24 Hours (Table)











 07/31/18 19:50 Blood Culture - Preliminary





 Blood    No Growth after 72 hours














Assessment and Plan


(1) Diverticulitis of intestine with perforation and abscess


Narrative/Plan: 


Will increase diet.  Anticipate discharge tomorrow.


Current Visit: Yes   Status: Acute   Code(s): K57.80 - DVTRCLI OF INTEST, PART 

UNSP, W PERF AND ABSCESS W/O BLEED   SNOMED Code(s): 554242600

## 2018-08-05 VITALS
DIASTOLIC BLOOD PRESSURE: 87 MMHG | TEMPERATURE: 97.6 F | SYSTOLIC BLOOD PRESSURE: 145 MMHG | HEART RATE: 64 BPM | RESPIRATION RATE: 16 BRPM

## 2018-08-05 RX ADMIN — ATORVASTATIN CALCIUM SCH MG: 40 TABLET, FILM COATED ORAL at 08:18

## 2018-08-05 RX ADMIN — DEXTROSE MONOHYDRATE SCH MLS/HR: 5 INJECTION, SOLUTION INTRAVENOUS at 08:16

## 2018-08-05 RX ADMIN — METOPROLOL SUCCINATE SCH MG: 25 TABLET, EXTENDED RELEASE ORAL at 08:17

## 2018-08-05 NOTE — P.DS
Providers


Date of admission: 


07/31/18 21:50





Expected date of discharge: 08/05/18


Attending physician: 


Maximiliano Harrison





Consults: 





 





08/01/18 19:02


Consult Physician Routine 


   Consulting Provider: Jasmyn Chaudhary


   Consult Reason/Comments: Medical management


   Do you want consulting provider notified?: Yes











Primary care physician: 


Alfred Brody








- Discharge Diagnosis(es)


(1) Diverticulitis of intestine with perforation and abscess


Please refer to the patient's recent history and physical and follow-up 

progress notes including consults.  Patient was admitted with diverticulitis 

and microperforation.  She was treated nonoperatively.  The patient has done 

well.  She is currently tolerating her diet.  She is afebrile.  White blood 

cell count is normal.  Abdomen is soft nontender nondistended.  We'll 

discharged today with plans for outpatient follow-up with Dr. Harrison in 1 

week.


Current Visit: Yes   Status: Acute   


Patient Condition at Discharge: Serious





Plan - Discharge Summary


Discharge Rx Participant: Yes


New Discharge Prescriptions: 


Discontinued


   Triamterene-Hctz 37.5-25Mg [Dyazide 37.5-25 Capsule] 1 cap PO DAILY





No Action


   ALPRAZolam 1 mg PO HS


   Metoprolol Succinate (ER) [Toprol Xl] 25 mg PO DAILY


   Atorvastatin [Lipitor] 40 mg PO DAILY


   Vitamin B Complex 1 cap PO DAILY


   Potassium 99 mg PO DAILY


   Phentermine HCl [Adipex-P] 37.5 mg PO QAM


   Ciprofloxacin HCl [Cipro] 500 mg PO Q12HR


Discharge Medication List





ALPRAZolam 1 mg PO HS 06/15/16 [History]


Atorvastatin [Lipitor] 40 mg PO DAILY 12/07/17 [History]


Metoprolol Succinate (ER) [Toprol Xl] 25 mg PO DAILY 12/07/17 [History]


Ciprofloxacin HCl [Cipro] 500 mg PO Q12HR 07/31/18 [History]


Phentermine HCl [Adipex-P] 37.5 mg PO QAM 07/31/18 [History]


Potassium 99 mg PO DAILY 07/31/18 [History]


Vitamin B Complex 1 cap PO DAILY 07/31/18 [History]








Follow up Appointment(s)/Referral(s): 


Alfred Brody DO [Primary Care Provider] - 1-2 days

## 2018-08-05 NOTE — P.PN
Subjective


Patient's abdominal pain is much better today.  No nausea no vomiting patient 

is able to tolerate full liquid diet today which will be advanced.  Patient is 

presently on Zosyn for diverticulitis with the intramural abscess





08/05/2018


Patient is clinically doing well patient is being discharged today and 

antibiotics management as per surgery.








Constitutional: Denied any fatigue denied any fever.


Cardio vascular: denied any chest pain, palpitations


Gastrointestinal denied any nausea vomiting


Pulmonary: Denied any shortness of breath cough


Neurologic denied any new focal deficits





Objective





- Vital Signs


Vital signs: 


 Vital Signs











Temp  97.6 F   08/05/18 07:00


 


Pulse  64   08/05/18 07:00


 


Resp  16   08/05/18 07:00


 


BP  145/87   08/05/18 07:00


 


Pulse Ox  99   08/05/18 07:00








 Intake & Output











 08/04/18 08/05/18 08/05/18





 18:59 06:59 18:59


 


Intake Total 1350 1075 240


 


Balance 1350 1075 240


 


Weight 95.708 kg  


 


Intake:   


 


  Intake, IV Titration 650 725 





  Amount   


 


    Piperacillin-Tazobactam 3 50 50 





    .375 gm In Dextrose/Water   





    1 50ml.bag @ 12.5 mls/hr   





    IVPB Q8HR [facility] Rx#:   





    [id number]   


 


    Sodium Chloride 0.9% 1, 600 675 





    000 ml @ 75 mls/hr IV .   





    [id number] [facility] Rx#:[id number]   


 


  Oral 700 350 240


 


Other:   


 


  Voiding Method  Toilet 


 


  # Voids 3 1 


 


  # Bowel Movements 1  














- Exam





PHYSICAL EXAMINATION: 





GENERAL: The patient is alert and oriented x3, not in any acute distress. Well 

developed, well nourished. 


HEENT: Pupils are round and equally reacting to light. EOMI. No scleral 

icterus. No conjunctival pallor. Normocephalic, atraumatic. No pharyngeal 

erythema. No thyromegaly. 


CARDIOVASCULAR: S1 and S2 present. No murmurs, rubs, or gallops. 


PULMONARY: Chest is clear to auscultation, no wheezing or crackles. 


ABDOMEN: Soft, nontender, nondistended, normoactive bowel sounds. No palpable 

organomegaly. 


MUSCULOSKELETAL: No joint swelling or deformity.


EXTREMITIES: No cyanosis, clubbing, or pedal edema. 


NEUROLOGICAL: Gross neurological examination did not reveal any focal deficits. 


SKIN: No rashes. 








- Labs


CBC & Chem 7: 


 08/04/18 05:37





 08/02/18 11:25


Labs: 


 Microbiology - Last 24 Hours (Table)











 07/31/18 19:50 Blood Culture - Preliminary





 Blood    No Growth after 96 hours














Assessment and Plan


Plan: 


-Diverticulitis with contained microperforation and a small abscess: No 

surgical intervention is being planned and patient is being discharged on oral 

antibiotics with close follow-up with cardiology.


-Hypertension: Patient can continue metoprolol and will not require any 

diuretic therapy which was discontinued upon discharge


-Severe osteoarthritis.


-Hyperlipidemia continue with statin.